# Patient Record
Sex: MALE | Race: WHITE | NOT HISPANIC OR LATINO | Employment: FULL TIME | URBAN - METROPOLITAN AREA
[De-identification: names, ages, dates, MRNs, and addresses within clinical notes are randomized per-mention and may not be internally consistent; named-entity substitution may affect disease eponyms.]

---

## 2023-06-02 ENCOUNTER — OFFICE VISIT (OUTPATIENT)
Dept: PRIMARY CARE | Facility: CLINIC | Age: 28
End: 2023-06-02
Payer: OTHER GOVERNMENT

## 2023-06-02 VITALS
OXYGEN SATURATION: 98 % | HEART RATE: 73 BPM | HEIGHT: 73 IN | SYSTOLIC BLOOD PRESSURE: 104 MMHG | DIASTOLIC BLOOD PRESSURE: 68 MMHG | TEMPERATURE: 97.7 F | BODY MASS INDEX: 22.53 KG/M2 | WEIGHT: 170 LBS

## 2023-06-02 DIAGNOSIS — Z00.00 WELLNESS EXAMINATION: ICD-10-CM

## 2023-06-02 DIAGNOSIS — Z30.09 VASECTOMY EVALUATION: Primary | ICD-10-CM

## 2023-06-02 DIAGNOSIS — E55.9 VITAMIN D DEFICIENCY: ICD-10-CM

## 2023-06-02 PROCEDURE — 99385 PREV VISIT NEW AGE 18-39: CPT | Performed by: STUDENT IN AN ORGANIZED HEALTH CARE EDUCATION/TRAINING PROGRAM

## 2023-06-02 PROCEDURE — 1036F TOBACCO NON-USER: CPT | Performed by: STUDENT IN AN ORGANIZED HEALTH CARE EDUCATION/TRAINING PROGRAM

## 2023-06-02 ASSESSMENT — ENCOUNTER SYMPTOMS
OCCASIONAL FEELINGS OF UNSTEADINESS: 0
DEPRESSION: 0
LOSS OF SENSATION IN FEET: 0

## 2023-06-02 NOTE — PROGRESS NOTES
HPI: 27-year-old male presenting to establish care and for CPE.  Patient doing relatively well without significant concern.  He works in the [employer], recently moved to the area, but from [place].   with 2 children, would like urological consultation for vasectomy.    Does get tenderness in his right elbow, left wrist sometimes.  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  Not currently in pain, episodic in nature.    Denies HA, changes in vision, chest pain, SOB/ORTIZ, palpitations, N/V/D/C, dysuria/hematuria, hematochezia/melena, paresthesias, LE edema.    12 point ROS reviewed and negative other than as stated in HPI    PMH: No medical hx  Medications: none  PSHx: denies  FHx: Dad - HTN  SocHx:   - Smoking: Never   - Alcohol: occasional   - Drug use: None  Allergies: NKDA    General: Alert, oriented, pleasant, in no acute distress  HEENT:      Head: normocephalic, atraumatic;      eyes: EOMI, no scleral icterus;   Neck: soft, supple, non-tender, no masses appreciated  CV: Heart with regular rate and rhythm, normal S1/S2, no murmurs  Lungs: CTAB without wheezing, rhonchi or rales; good respiratory effort, no increased work of breathing  Abdomen: soft, non-tender, non-distended, no masses appreciated  Extremities: no edema, no cyanosis  MSK: ROM of upper and lower extremities intact; strength 5/5 upper and lower extremities  Neuro: Cranial nerves grossly intact; alert and oriented, normal gait  Psych: Appropriate mood and affect    1. HM  -CBC, CMP, Lipid panel, Vit D, TSH with reflex T4  -Vaccines:       Flu: out of season      Shingrix: at 50      Pneumococcal: at 65      Tdap: UTD, in [facility]  -Lung cancer screening with low-dose CT: never smoker  -Colonoscopy: at 45    2.  Vasectomy evaluation  - Urology referral    3.  Lateral epicondylitis  - Handed out exercises, explained first-line treatment including OTC NSAIDs, and preventative  measures such as routine exercise    F/U annually or sooner if indicated    Chris D'Amico, DO

## 2023-06-03 ENCOUNTER — LAB (OUTPATIENT)
Dept: LAB | Facility: LAB | Age: 28
End: 2023-06-03
Payer: OTHER GOVERNMENT

## 2023-06-03 DIAGNOSIS — Z30.09 VASECTOMY EVALUATION: ICD-10-CM

## 2023-06-03 DIAGNOSIS — E55.9 VITAMIN D DEFICIENCY: ICD-10-CM

## 2023-06-03 DIAGNOSIS — Z00.00 WELLNESS EXAMINATION: ICD-10-CM

## 2023-06-03 LAB
ALANINE AMINOTRANSFERASE (SGPT) (U/L) IN SER/PLAS: 22 U/L (ref 10–52)
ALBUMIN (G/DL) IN SER/PLAS: 4.5 G/DL (ref 3.4–5)
ALKALINE PHOSPHATASE (U/L) IN SER/PLAS: 62 U/L (ref 33–120)
ANION GAP IN SER/PLAS: 12 MMOL/L (ref 10–20)
ASPARTATE AMINOTRANSFERASE (SGOT) (U/L) IN SER/PLAS: 21 U/L (ref 9–39)
BILIRUBIN TOTAL (MG/DL) IN SER/PLAS: 0.9 MG/DL (ref 0–1.2)
CALCIDIOL (25 OH VITAMIN D3) (NG/ML) IN SER/PLAS: 27 NG/ML
CALCIUM (MG/DL) IN SER/PLAS: 9.9 MG/DL (ref 8.6–10.6)
CARBON DIOXIDE, TOTAL (MMOL/L) IN SER/PLAS: 26 MMOL/L (ref 21–32)
CHLORIDE (MMOL/L) IN SER/PLAS: 105 MMOL/L (ref 98–107)
CHOLESTEROL (MG/DL) IN SER/PLAS: 133 MG/DL (ref 0–199)
CHOLESTEROL IN HDL (MG/DL) IN SER/PLAS: 48.1 MG/DL
CHOLESTEROL/HDL RATIO: 2.8
CREATININE (MG/DL) IN SER/PLAS: 0.96 MG/DL (ref 0.5–1.3)
ERYTHROCYTE DISTRIBUTION WIDTH (RATIO) BY AUTOMATED COUNT: 12.9 % (ref 11.5–14.5)
ERYTHROCYTE MEAN CORPUSCULAR HEMOGLOBIN CONCENTRATION (G/DL) BY AUTOMATED: 33.2 G/DL (ref 32–36)
ERYTHROCYTE MEAN CORPUSCULAR VOLUME (FL) BY AUTOMATED COUNT: 87 FL (ref 80–100)
ERYTHROCYTES (10*6/UL) IN BLOOD BY AUTOMATED COUNT: 4.92 X10E12/L (ref 4.5–5.9)
GFR MALE: >90 ML/MIN/1.73M2
GLUCOSE (MG/DL) IN SER/PLAS: 93 MG/DL (ref 74–99)
HEMATOCRIT (%) IN BLOOD BY AUTOMATED COUNT: 42.8 % (ref 41–52)
HEMOGLOBIN (G/DL) IN BLOOD: 14.2 G/DL (ref 13.5–17.5)
LDL: 75 MG/DL (ref 0–99)
LEUKOCYTES (10*3/UL) IN BLOOD BY AUTOMATED COUNT: 4.8 X10E9/L (ref 4.4–11.3)
NRBC (PER 100 WBCS) BY AUTOMATED COUNT: 0 /100 WBC (ref 0–0)
PLATELETS (10*3/UL) IN BLOOD AUTOMATED COUNT: 201 X10E9/L (ref 150–450)
POTASSIUM (MMOL/L) IN SER/PLAS: 4.2 MMOL/L (ref 3.5–5.3)
PROTEIN TOTAL: 7.4 G/DL (ref 6.4–8.2)
SODIUM (MMOL/L) IN SER/PLAS: 139 MMOL/L (ref 136–145)
THYROTROPIN (MIU/L) IN SER/PLAS BY DETECTION LIMIT <= 0.05 MIU/L: 2.42 MIU/L (ref 0.44–3.98)
TRIGLYCERIDE (MG/DL) IN SER/PLAS: 48 MG/DL (ref 0–149)
UREA NITROGEN (MG/DL) IN SER/PLAS: 14 MG/DL (ref 6–23)
VLDL: 10 MG/DL (ref 0–40)

## 2023-06-03 PROCEDURE — 84443 ASSAY THYROID STIM HORMONE: CPT

## 2023-06-03 PROCEDURE — 36415 COLL VENOUS BLD VENIPUNCTURE: CPT

## 2023-06-03 PROCEDURE — 82306 VITAMIN D 25 HYDROXY: CPT

## 2023-06-03 PROCEDURE — 85027 COMPLETE CBC AUTOMATED: CPT

## 2023-06-03 PROCEDURE — 80061 LIPID PANEL: CPT

## 2023-06-03 PROCEDURE — 80053 COMPREHEN METABOLIC PANEL: CPT

## 2023-06-05 NOTE — RESULT ENCOUNTER NOTE
Vitamin D borderline low at 27, would recommend OTC vitamin D3, 1000 units daily.    All remaining labs are unremarkable.

## 2024-01-31 DIAGNOSIS — Z98.52 STATUS POST VASECTOMY: Primary | ICD-10-CM

## 2024-01-31 DIAGNOSIS — Z30.09 VASECTOMY EVALUATION: Primary | ICD-10-CM

## 2024-02-01 ENCOUNTER — ANCILLARY PROCEDURE (OUTPATIENT)
Dept: ENDOCRINOLOGY | Facility: CLINIC | Age: 29
End: 2024-02-01
Payer: OTHER GOVERNMENT

## 2024-02-01 DIAGNOSIS — Z30.09 VASECTOMY EVALUATION: ICD-10-CM

## 2024-02-01 LAB
ABSTINENCE (DAYS): 3 DAYS (ref 2–7)
AGGLUTINATION (SEMEN): NO
ANALYZED TIME:: ABNORMAL
ANDROLOGY LAB ID#: ABNORMAL
CLUMPS (SEMEN): NO
COLLECTED COMPLETELY: YES
COLLECTION LOCATION:: ABNORMAL
COLLECTION METHOD:: ABNORMAL
CONCENTRATION CN (POST-WASH): 0 MILL/ML
CONCENTRATION(SEMEN): 0 MILL/ML (ref 15–?)
DEBRIS (SEMEN): YES
NON PROG. MOTILITY (SEMEN): 0 %
NON PROG. MOTILITY CN (POST-WASH): 0 %
PROG. MOTILITY (SEMEN): 0 % (ref 32–?)
PROG. MOTILITY CN (POST-WASH): 0 %
RECEIVED TIME:: ABNORMAL
SEMEN APPEARANCE: NORMAL
SEMEN LIQUEFACTION: NORMAL
SEMEN VISCOSITY: ABNORMAL
TOTAL MOTILITY (SEMEN): 0 % (ref 40–?)
TOTAL MOTILITY CN (POST-WASH): 0 %
TOTAL NO OF MOTILE (SEMEN): 0 MILL
TOTAL NO OF MOTILE CN (POST-WASH): 0 MILL
TOTAL NO OF SPERM (SEMEN): 0 MILL (ref 39–?)
TOTAL NO OF SPERM CN (POST-WASH): 0 MILL
VOLUME (SEMEN): 2 ML (ref 1.5–?)
VOLUME CN (POST-WASH): 0.2 ML

## 2024-02-01 PROCEDURE — 89321 SEMEN ANAL SPERM DETECTION: CPT | Performed by: OBSTETRICS & GYNECOLOGY

## 2024-07-31 ENCOUNTER — APPOINTMENT (OUTPATIENT)
Dept: PRIMARY CARE | Facility: CLINIC | Age: 29
End: 2024-07-31
Payer: OTHER GOVERNMENT

## 2024-08-01 ENCOUNTER — OFFICE VISIT (OUTPATIENT)
Dept: PRIMARY CARE | Facility: CLINIC | Age: 29
End: 2024-08-01
Payer: OTHER GOVERNMENT

## 2024-08-01 VITALS
HEART RATE: 86 BPM | SYSTOLIC BLOOD PRESSURE: 118 MMHG | TEMPERATURE: 98.2 F | HEIGHT: 72 IN | DIASTOLIC BLOOD PRESSURE: 72 MMHG | WEIGHT: 180 LBS | BODY MASS INDEX: 24.38 KG/M2 | OXYGEN SATURATION: 97 %

## 2024-08-01 DIAGNOSIS — E55.9 VITAMIN D DEFICIENCY: ICD-10-CM

## 2024-08-01 DIAGNOSIS — Z23 NEED FOR TETANUS BOOSTER: ICD-10-CM

## 2024-08-01 DIAGNOSIS — Z00.00 ENCOUNTER FOR ANNUAL PHYSICAL EXAM: Primary | ICD-10-CM

## 2024-08-01 DIAGNOSIS — M79.641 PAIN OF RIGHT HAND: ICD-10-CM

## 2024-08-01 PROCEDURE — 3008F BODY MASS INDEX DOCD: CPT | Performed by: INTERNAL MEDICINE

## 2024-08-01 PROCEDURE — 1036F TOBACCO NON-USER: CPT | Performed by: INTERNAL MEDICINE

## 2024-08-01 PROCEDURE — 90715 TDAP VACCINE 7 YRS/> IM: CPT | Performed by: INTERNAL MEDICINE

## 2024-08-01 PROCEDURE — 99395 PREV VISIT EST AGE 18-39: CPT | Performed by: INTERNAL MEDICINE

## 2024-08-01 PROCEDURE — 90471 IMMUNIZATION ADMIN: CPT | Performed by: INTERNAL MEDICINE

## 2024-08-01 ASSESSMENT — ENCOUNTER SYMPTOMS
SORE THROAT: 0
DIZZINESS: 0
PALPITATIONS: 0
FLANK PAIN: 0
APPETITE CHANGE: 0
WEAKNESS: 0
ABDOMINAL DISTENTION: 0
RHINORRHEA: 0
NERVOUS/ANXIOUS: 0
ACTIVITY CHANGE: 0
BRUISES/BLEEDS EASILY: 0
CONFUSION: 0
NAUSEA: 0
STRIDOR: 0
SINUS PRESSURE: 0
CHEST TIGHTNESS: 0
PHOTOPHOBIA: 0
SLEEP DISTURBANCE: 0
MYALGIAS: 1
TROUBLE SWALLOWING: 0
HALLUCINATIONS: 0
COLOR CHANGE: 0
BACK PAIN: 0
FEVER: 0
POLYPHAGIA: 0
CHOKING: 0
DYSURIA: 0
FATIGUE: 0
DYSPHORIC MOOD: 0
CONSTIPATION: 0
VOICE CHANGE: 0
EYE REDNESS: 0
FACIAL ASYMMETRY: 0
SINUS PAIN: 0
WOUND: 0
SHORTNESS OF BREATH: 0
FREQUENCY: 0
VOMITING: 0
DIARRHEA: 0
COUGH: 0
ABDOMINAL PAIN: 0
SEIZURES: 0
BLOOD IN STOOL: 0
EYE DISCHARGE: 0
NUMBNESS: 0
HEMATURIA: 0
SPEECH DIFFICULTY: 0
ARTHRALGIAS: 1
DIFFICULTY URINATING: 0
POLYDIPSIA: 0
WHEEZING: 0
HEADACHES: 0
NECK STIFFNESS: 0

## 2024-08-01 NOTE — PATIENT INSTRUCTIONS
If your right elbow symptoms do not improve by 50% from today in next 3 weeks than call our office.     If right calf muscle symptoms get worse and you are not able to do your routine day to day activities than call us.

## 2024-08-01 NOTE — PROGRESS NOTES
Subjective   Patient ID: Darrius Medrano is a 29 y.o. male who presents for New Patient Visit.    HPI   Patient presented to the office for new patient visit.    His last labs were done last year and his Vitamin D level was 27 but otherwise rest of the labs were good. He is not on oral Vitamin D supplement.    2 weeks ago he got hit at the right elbow joint while playing and since then he has pain in front and back of the elbow joint. Still able to do pronation and supination without difficulty. Pain is getting better but is there.    He also have problem with right hand. He is right handed and works as a [employer] and he has to do several activities which involve the use of right hand especially fingers.    Review of Systems   Constitutional:  Negative for activity change, appetite change, fatigue and fever.   HENT:  Negative for congestion, dental problem, ear pain, hearing loss, rhinorrhea, sinus pressure, sinus pain, sore throat, trouble swallowing and voice change.    Eyes:  Negative for photophobia, discharge, redness and visual disturbance.   Respiratory:  Negative for cough, choking, chest tightness, shortness of breath, wheezing and stridor.    Cardiovascular:  Negative for chest pain, palpitations and leg swelling.   Gastrointestinal:  Negative for abdominal distention, abdominal pain, blood in stool, constipation, diarrhea, nausea and vomiting.   Endocrine: Negative for polydipsia, polyphagia and polyuria.   Genitourinary:  Negative for difficulty urinating, dysuria, flank pain, frequency, hematuria and urgency.   Musculoskeletal:  Positive for arthralgias and myalgias. Negative for back pain and neck stiffness.   Skin:  Negative for color change, rash and wound.   Allergic/Immunologic: Negative for immunocompromised state.   Neurological:  Negative for dizziness, seizures, syncope, facial asymmetry, speech difficulty, weakness, numbness and headaches.   Hematological:  Does not bruise/bleed easily.    Psychiatric/Behavioral:  Negative for behavioral problems, confusion, dysphoric mood, hallucinations, sleep disturbance and suicidal ideas. The patient is not nervous/anxious.      Objective   /72   Pulse 86   Temp 36.8 °C (98.2 °F)   Ht 1.829 m (6')   Wt 81.6 kg (180 lb)   SpO2 97%   BMI 24.41 kg/m²     Physical Exam  Vitals and nursing note reviewed.   Constitutional:       General: He is not in acute distress.     Appearance: Normal appearance. He is normal weight. He is not ill-appearing or toxic-appearing.   HENT:      Head: Normocephalic and atraumatic.      Nose: Nose normal. No congestion or rhinorrhea.      Mouth/Throat:      Mouth: Mucous membranes are moist.   Eyes:      General: No scleral icterus.     Extraocular Movements: Extraocular movements intact.      Conjunctiva/sclera: Conjunctivae normal.      Pupils: Pupils are equal, round, and reactive to light.   Cardiovascular:      Rate and Rhythm: Normal rate and regular rhythm.      Pulses: Normal pulses.      Heart sounds: Normal heart sounds. No murmur heard.     No gallop.   Pulmonary:      Effort: Pulmonary effort is normal. No respiratory distress.      Breath sounds: Normal breath sounds. No stridor. No wheezing, rhonchi or rales.   Abdominal:      General: Bowel sounds are normal.      Tenderness: There is no abdominal tenderness. There is no right CVA tenderness or left CVA tenderness.   Musculoskeletal:         General: No swelling or deformity. Normal range of motion.      Cervical back: Normal range of motion and neck supple. No rigidity or tenderness.      Right lower leg: No edema.      Left lower leg: No edema.   Lymphadenopathy:      Cervical: No cervical adenopathy.   Skin:     General: Skin is warm.      Coloration: Skin is not jaundiced.      Findings: No erythema or lesion.   Neurological:      General: No focal deficit present.      Mental Status: He is alert and oriented to person, place, and time.      Cranial  Nerves: No cranial nerve deficit.      Motor: No weakness.      Coordination: Coordination normal.      Gait: Gait normal.   Psychiatric:         Mood and Affect: Mood normal.         Behavior: Behavior normal.         Thought Content: Thought content normal.         Judgment: Judgment normal.       Assessment/Plan   Problem List Items Addressed This Visit       Vitamin D deficiency    Relevant Orders    Vitamin D 25-Hydroxy,Total (for eval of Vitamin D levels)    Pain of right hand    Relevant Orders    Referral to Orthopaedic Surgery     Other Visit Diagnoses       Encounter for annual physical exam    -  Primary    Relevant Orders    CBC and Auto Differential    Comprehensive Metabolic Panel    Hemoglobin A1C    Lipid Panel    PSA, Total and Free    Hepatitis C Antibody    HIV 1/2 Antigen/Antibody Screen with Reflex to Confirmation    TSH with reflex to Free T4 if abnormal    Need for tetanus booster        Relevant Orders    Tdap vaccine, age 7 years and older  (BOOSTRIX) (Completed)          Right upper extremity exam is negative for any focal finding. He has been discussed to call office in 3 weeks if symptoms are not better.  I just don't see any reason to refer him to any other specialist beside hand orthopedics.

## 2024-08-29 ENCOUNTER — APPOINTMENT (OUTPATIENT)
Dept: ORTHOPEDIC SURGERY | Facility: CLINIC | Age: 29
End: 2024-08-29
Payer: OTHER GOVERNMENT

## 2024-08-29 ENCOUNTER — HOSPITAL ENCOUNTER (OUTPATIENT)
Dept: RADIOLOGY | Facility: CLINIC | Age: 29
Discharge: HOME | End: 2024-08-29
Payer: OTHER GOVERNMENT

## 2024-08-29 DIAGNOSIS — M79.641 PAIN OF RIGHT HAND: ICD-10-CM

## 2024-08-29 PROCEDURE — 73130 X-RAY EXAM OF HAND: CPT | Mod: RT

## 2024-08-29 PROCEDURE — 99203 OFFICE O/P NEW LOW 30 MIN: CPT | Performed by: ORTHOPAEDIC SURGERY

## 2024-08-29 PROCEDURE — 1036F TOBACCO NON-USER: CPT | Performed by: ORTHOPAEDIC SURGERY

## 2024-08-29 NOTE — PROGRESS NOTES
CHIEF COMPLAINT         Right hand pain    ASSESSMENT + PLAN    Right hand intrinsic muscle tightness    I reviewed the unusual nature of the condition and the typical clinical course.  I reviewed how to do intrinsic muscle stretching.  I offered a formal Occupational Therapy referral to help with this as well.  You may safely advance activity with no particular restrictions.    Follow-up with any additional concerns.        HISTORY OF PRESENT ILLNESS       Patient is a 29 y.o. right-hand dominant male [employer] , who presents today for evaluation of right hand pain.  This began on duty about 3 to 6 months ago.  It was a gradual onset.  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  On days where he has been using the hands a lot, he has aching pain into the right ring and long PIP and DIP joints.  No single specific recalled trauma.  No numbness or tingling.  No popping, clicking, or subjective instability.  No similar problems on the left hand.    He is not diabetic or hypothyroid.  He does not smoke.      REVIEW OF SYSTEMS       A 30-item multi-system Review Of Systems was obtained on today's intake form.  This was reviewed with the patient and is correct.  The pertinent positives and negatives are listed above.  The form has been scanned separately into the medical record.      PHYSICAL EXAM    Constitutional:    Appears stated age. Well-developed and well-nourished male in no acute distress.  Psychiatric:         Pleasant normal mood and affect. Behavior is appropriate for the situation.   Head:                   Normocephalic and atraumatic.  Eyes:                    Pupils are equal and round.  Cardiovascular:  2+ radial and ulnar pulses. Fingers well-perfused.  Respiratory:        Effort normal. No respiratory distress. Speaking in complete sentences.  Neurologic:       Alert and oriented to person, place, and time.  Skin:                Skin is intact, warm and dry.  Hematologic /  Lymphatic:    No lymphedema or lymphangitis.    Extremities / Musculoskeletal:                      Skin of the right hand and wrist is intact with no erythema, ecchymosis, or diffuse swelling.  Normal skin drag and coloration.  Full composite finger flexion extension.  Intrinsic minus posture lacks about 2 centimeters in ring and long and reproduces his discomfort.  Good intrinsic plus position actively.  More general discomfort with the transition but not suggesting saddle syndrome.  Tendons are intact by individual testing.  Normal resting cascade.  Good sagittal plane balance.  Symmetric wrist and forearm motion.  Negative Flower.  Negative midcarpal shift.  Negative PT compression and LT shear test.  Stable DRUJ in all stations.  Sensation intact to light touch in all distributions.  Capillary refill less than 2 seconds.      IMAGING / LABS / EMGs           X-rays right hand ordered and independently interpreted by me today show no acute fracture, subluxation, or foreign body.  The joint spaces are concentric and well-preserved.      No past medical history on file.    Medication Documentation Review Audit       Reviewed by Nicko Paez MD (Physician) on 08/01/24 at 1411      Medication Order Taking? Sig Documenting Provider Last Dose Status            No Medications to Display                                   No Known Allergies    Social History     Socioeconomic History    Marital status:      Spouse name: Not on file    Number of children: Not on file    Years of education: Not on file    Highest education level: Not on file   Occupational History    Not on file   Tobacco Use    Smoking status: Never    Smokeless tobacco: Never   Vaping Use    Vaping status: Never Used   Substance and Sexual Activity    Alcohol use: Not on file    Drug use: Not on file    Sexual activity: Not on file   Other Topics Concern    Not on file   Social History Narrative    Not on file     Social Determinants of Health      Financial Resource Strain: Not on file   Food Insecurity: Not on file   Transportation Needs: Not on file   Physical Activity: Not on file   Stress: Not on file   Social Connections: Not on file   Intimate Partner Violence: Not on file   Housing Stability: Not on file       No past surgical history on file.      Electronically Signed      VARGAS Ellis MD      Orthopaedic Hand Surgery      [phone]

## 2024-08-29 NOTE — LETTER
September 7, 2024     Nicko Paez MD  [address]  [address]  [facility] [address]    Patient: Darrius Medrano   YOB: 1995   Date of Visit: 8/29/2024       Dear Dr. Nicko Paez MD:    Thank you for referring Darrius Medrano to me for evaluation. Below are my notes for this consultation.  If you have questions, please do not hesitate to call me. I look forward to following your patient along with you.       Sincerely,     Flavio Ellis MD      CC: No Recipients  ______________________________________________________________________________________    CHIEF COMPLAINT         Right hand pain    ASSESSMENT + PLAN    Right hand intrinsic muscle tightness    I reviewed the unusual nature of the condition and the typical clinical course.  I reviewed how to do intrinsic muscle stretching.  I offered a formal Occupational Therapy referral to help with this as well.  You may safely advance activity with no particular restrictions.    Follow-up with any additional concerns.        HISTORY OF PRESENT ILLNESS       Patient is a 29 y.o. right-hand dominant male [employer] , who presents today for evaluation of right hand pain.  This began on duty about 3 to 6 months ago.  It was a gradual onset.  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  On days where he has been using the hands a lot, he has aching pain into the right ring and long PIP and DIP joints.  No single specific recalled trauma.  No numbness or tingling.  No popping, clicking, or subjective instability.  No similar problems on the left hand.    He is not diabetic or hypothyroid.  He does not smoke.      REVIEW OF SYSTEMS       A 30-item multi-system Review Of Systems was obtained on today's intake form.  This was reviewed with the patient and is correct.  The pertinent positives and negatives are listed above.  The form has been scanned separately into the medical record.      PHYSICAL EXAM    Constitutional:    Appears  stated age. Well-developed and well-nourished male in no acute distress.  Psychiatric:         Pleasant normal mood and affect. Behavior is appropriate for the situation.   Head:                   Normocephalic and atraumatic.  Eyes:                    Pupils are equal and round.  Cardiovascular:  2+ radial and ulnar pulses. Fingers well-perfused.  Respiratory:        Effort normal. No respiratory distress. Speaking in complete sentences.  Neurologic:       Alert and oriented to person, place, and time.  Skin:                Skin is intact, warm and dry.  Hematologic / Lymphatic:    No lymphedema or lymphangitis.    Extremities / Musculoskeletal:                      Skin of the right hand and wrist is intact with no erythema, ecchymosis, or diffuse swelling.  Normal skin drag and coloration.  Full composite finger flexion extension.  Intrinsic minus posture lacks about 2 centimeters in ring and long and reproduces his discomfort.  Good intrinsic plus position actively.  More general discomfort with the transition but not suggesting saddle syndrome.  Tendons are intact by individual testing.  Normal resting cascade.  Good sagittal plane balance.  Symmetric wrist and forearm motion.  Negative Flower.  Negative midcarpal shift.  Negative PT compression and LT shear test.  Stable DRUJ in all stations.  Sensation intact to light touch in all distributions.  Capillary refill less than 2 seconds.      IMAGING / LABS / EMGs           X-rays right hand ordered and independently interpreted by me today show no acute fracture, subluxation, or foreign body.  The joint spaces are concentric and well-preserved.      No past medical history on file.    Medication Documentation Review Audit       Reviewed by Nicko Paez MD (Physician) on 08/01/24 at 1411      Medication Order Taking? Sig Documenting Provider Last Dose Status            No Medications to Display                                   No Known Allergies    Social History      Socioeconomic History   • Marital status:      Spouse name: Not on file   • Number of children: Not on file   • Years of education: Not on file   • Highest education level: Not on file   Occupational History   • Not on file   Tobacco Use   • Smoking status: Never   • Smokeless tobacco: Never   Vaping Use   • Vaping status: Never Used   Substance and Sexual Activity   • Alcohol use: Not on file   • Drug use: Not on file   • Sexual activity: Not on file   Other Topics Concern   • Not on file   Social History Narrative   • Not on file     Social Determinants of Health     Financial Resource Strain: Not on file   Food Insecurity: Not on file   Transportation Needs: Not on file   Physical Activity: Not on file   Stress: Not on file   Social Connections: Not on file   Intimate Partner Violence: Not on file   Housing Stability: Not on file       No past surgical history on file.      Electronically Signed      VARGAS Ellis MD      Orthopaedic Hand Surgery      [phone]

## 2024-09-20 ENCOUNTER — APPOINTMENT (OUTPATIENT)
Dept: OCCUPATIONAL THERAPY | Facility: CLINIC | Age: 29
End: 2024-09-20
Payer: OTHER GOVERNMENT

## 2024-09-26 ENCOUNTER — EVALUATION (OUTPATIENT)
Dept: OCCUPATIONAL THERAPY | Facility: CLINIC | Age: 29
End: 2024-09-26
Payer: OTHER GOVERNMENT

## 2024-09-26 DIAGNOSIS — M79.641 PAIN OF RIGHT HAND: Primary | ICD-10-CM

## 2024-09-26 PROCEDURE — 97110 THERAPEUTIC EXERCISES: CPT | Mod: GO | Performed by: OCCUPATIONAL THERAPIST

## 2024-09-26 PROCEDURE — 97165 OT EVAL LOW COMPLEX 30 MIN: CPT | Mod: GO | Performed by: OCCUPATIONAL THERAPIST

## 2024-09-26 ASSESSMENT — PAIN DESCRIPTION - DESCRIPTORS: DESCRIPTORS: TENDER

## 2024-09-26 ASSESSMENT — PAIN SCALES - GENERAL: PAINLEVEL_OUTOF10: 5 - MODERATE PAIN

## 2024-09-26 ASSESSMENT — PAIN - FUNCTIONAL ASSESSMENT: PAIN_FUNCTIONAL_ASSESSMENT: 0-10

## 2024-09-26 NOTE — PROGRESS NOTES
Occupational Therapy   Upper Extremity Evaluation Note    Patient Name: Darrius Medrano  MRN: [id number]  Referring Physician: Dr. Ellis  Allergies:   none Date of Injury:  onset 3-6 months  Mechanism of Injury: NA   Date of Surgery: NA  Precautions: none          Current Problem  1. Pain of right hand  Referral to Occupational Therapy    Follow Up In Occupational Therapy       Insurance    Visit number: 1  Approved number of visits:    Authorization required prior to treatment: Yes  Insurance Company:  [employer] - PRIOR AUTH REQUIRED (sent to [employer] w/s) - 100% COVERAGE / $0 OUT OF POCKET / Per [employer] w/s / ds 9/19/24.   Co-Pay $0.00           MRN: [id number]  OT Received on: 9/26/2024    Time Calculation  Start Time: 1146  Stop Time: 1240  Time Calculation (min): 54 min  OT Evaluation Time Entry  OT Evaluation (Low) Time Entry: 30  OT Therapeutic Procedures Time Entry  Therapeutic Exercise Time Entry: 24    Visit # 1 of 3  Visits/Dates Authorized: approved       Subjective  Patient's Chief Concern: right hand pain right long finger  Patient would like to functionally improve: reduce pain when gripping    Pain  Pain Assessment  Pain Assessment: 0-10  0-10 (Numeric) Pain Score: 5 - Moderate pain  Pain Location: Finger (Comment which one)  Pain Orientation: Right  Pain Radiating Towards: right long finger PIP joint  Pain Descriptors: Tender  Pain Frequency: Intermittent  Clinical Progression: Not changed  Effect of Pain on Daily Activities: when gripping, has occurred for the last few years      Objective  Hand Dominance: RIGHT     Affected Extremity:   RIght    Outcome Measures:  UEFI score 72/80  Other Outcome Measures: UEFI score 72/80    ADLS/IADLS:  indep with alls ADL and IADL;    Skin/ Wound/Scar:  WNL    Sensation: WFL intact to light touch    Dexterity: WNL    Special Tests:  completed per Dr. Ellis:  Full composite finger flexion extension. Intrinsic minus posture lacks about 2  centimeters in ring and long and reproduces his discomfort. Good intrinsic plus position actively. More general discomfort with the transition but not suggesting saddle syndrome. Tendons are intact by individual testing. Normal resting cascade. Good sagittal plane balance. Symmetric wrist and forearm motion. Negative Flower. Negative midcarpal shift. Negative PT compression and LT shear test. Stable DRUJ in all stations. Sensation intact to light touch in all distributions.       Hand Strength  Sudhir Dynamometer    Gross Grasp (lbs)  Right Left   2nd setting 135 lbs. Flexed elbow and 127 lbs. ext elbow 135 lbs. Flexed elbow and 130 lbs. extended       Pinch Strength Right Left   Lateral 32 lbs. 24 lbs.   Tripod 28 lbs.  24 lbs.    Tip          TREATMENT:  Completed and provided AROM of all fingers including tendon glides and intrinsic stretching issued handout for HEP 3 x daily for 20 reps;   Provided T-putty resistance medium to firm with ext/add/abd and gripping and pinching with correct position as verbal and manual cues provided.  Edema Management including icing with firm palmar pressure Palmar massage with tools provided for home program  Soft tissue mobilization with volar/dorsal and intrinsic mm      HEP and Patient Education:      Educated patient to diagnosis and rehab expectations including frequency and duration of services.     ASSESSMENT:  Evaluation Data: Patient is a 30 yo male  s/p right hand pain unknown onset, 3-6 months resulting in limited participation in pain-free gripping with work tasks and ADLs and inability to perform at their prior level of function. Skilled Occupational Therapy services are warranted to address the impairment of full  ability without pain. Goals address to realize measurable change in the outcome measures and achieve improvements in patient’s functional status and individual goals.     Home living/Social Support: Resides home, independently, with spouse,  2  children, enjoys sports, baseball, basketball, bowling, completes lawn care. .  Occupation:  works as construction at [employer]  Medical Leave/Modified Duty: no  Meaningful Activities:  sports  Prior Level of Function Per Patient/Caregiver Report: Independent with ADL, IADL, work and leisure activities  Patient would like to gain recovery of their RIGHT hand function to improve their level of independence with ADLs, IADLs    PLAN OF CARE:    Plan    Plan to follow every 2 weeks for 6 weeks; recheck strength and HEP upgrade as needed.  Follow Up with Referring Physician: to be determined  Monitor home program.    Patient instructed to call or email with questions or concerns.    Frequency: 1x/week  Duration: 6 weeks  Comprehensive reassessments will be completed intermittently.   Potential to achieve rehab goals is good . Patient would benefit from skilled Occupational Therapy services to address deficits and promote functional gains and return to timely completion of self care demands and IADL's.         Planned Interventions include: wound care as needed, patient education/instruction, home program instruction and review, edema control, manual therapy, motor coordination, therapeutic exercise, therapeutic activities, ADL and IADL retraining, neuromuscular re-education, dry needling, NMES, Fluidotherapy, ultrasound, Kinesiotaping, orthosis fabrication/fit training.    CHART REVIEW: YES    Goals:  Active       OT Problem       Patient will report 0/10 no pain HEP with abd/add intrinsic strengthening program.       Start:  09/26/24    Expected End:  12/25/24            Pt will demonstrate good understanding of cumulative trauma and use of stretching and warm up, icing for palmar gripping tasks. (Progressing)       Start:  09/26/24    Expected End:  12/25/24            PATIENT WILL SHOW A SIGNIFICANT CHANGE IN UEFI PATIENT REPORTED OUTCOME TOOL TO DEMONSTRATE SUBJECTIVE IMPROVEMENT       Start:  09/26/24    Expected  End:  12/25/24            PATIENT WILL DEMONSTRATE INDEPENDENCE IN HOME PROGRAM FOR SUPPORT OF PROGRESSION (Progressing)       Start:  09/26/24    Expected End:  12/25/24            PATIENT WILL REPORT PAIN OF 0/10 DEMONSTRATING A REDUCTION OF OVERALL PAIN       Start:  09/26/24    Expected End:  12/25/24

## 2024-10-01 ENCOUNTER — APPOINTMENT (OUTPATIENT)
Dept: PRIMARY CARE | Facility: CLINIC | Age: 29
End: 2024-10-01
Payer: OTHER GOVERNMENT

## 2024-10-01 ENCOUNTER — LAB (OUTPATIENT)
Dept: LAB | Facility: LAB | Age: 29
End: 2024-10-01
Payer: OTHER GOVERNMENT

## 2024-10-01 ENCOUNTER — OFFICE VISIT (OUTPATIENT)
Dept: PRIMARY CARE | Facility: CLINIC | Age: 29
End: 2024-10-01
Payer: OTHER GOVERNMENT

## 2024-10-01 VITALS
DIASTOLIC BLOOD PRESSURE: 84 MMHG | OXYGEN SATURATION: 98 % | BODY MASS INDEX: 24.22 KG/M2 | SYSTOLIC BLOOD PRESSURE: 118 MMHG | HEART RATE: 72 BPM | WEIGHT: 178.6 LBS | TEMPERATURE: 98 F

## 2024-10-01 DIAGNOSIS — Z00.00 ENCOUNTER FOR ANNUAL PHYSICAL EXAM: ICD-10-CM

## 2024-10-01 DIAGNOSIS — M79.641 PAIN OF RIGHT HAND: Primary | ICD-10-CM

## 2024-10-01 DIAGNOSIS — E55.9 VITAMIN D DEFICIENCY: ICD-10-CM

## 2024-10-01 LAB — TSH SERPL-ACNC: 0.79 MIU/L (ref 0.44–3.98)

## 2024-10-01 PROCEDURE — 84443 ASSAY THYROID STIM HORMONE: CPT

## 2024-10-01 PROCEDURE — 36415 COLL VENOUS BLD VENIPUNCTURE: CPT

## 2024-10-01 PROCEDURE — 1036F TOBACCO NON-USER: CPT | Performed by: INTERNAL MEDICINE

## 2024-10-01 PROCEDURE — 99213 OFFICE O/P EST LOW 20 MIN: CPT | Performed by: INTERNAL MEDICINE

## 2024-10-01 ASSESSMENT — PATIENT HEALTH QUESTIONNAIRE - PHQ9
1. LITTLE INTEREST OR PLEASURE IN DOING THINGS: NOT AT ALL
SUM OF ALL RESPONSES TO PHQ9 QUESTIONS 1 AND 2: 0
2. FEELING DOWN, DEPRESSED OR HOPELESS: NOT AT ALL

## 2024-10-01 ASSESSMENT — ENCOUNTER SYMPTOMS
FACIAL ASYMMETRY: 0
DYSURIA: 0
ACTIVITY CHANGE: 0
FLANK PAIN: 0
DIFFICULTY URINATING: 0
BLOOD IN STOOL: 0
CHOKING: 0
PALPITATIONS: 0
SPEECH DIFFICULTY: 0
ABDOMINAL PAIN: 0
HEADACHES: 0
HALLUCINATIONS: 0
FEVER: 0
NAUSEA: 0
NUMBNESS: 0
POLYDIPSIA: 0
FREQUENCY: 0
PHOTOPHOBIA: 0
DYSPHORIC MOOD: 0
CONSTIPATION: 0
NERVOUS/ANXIOUS: 0
FATIGUE: 0
COLOR CHANGE: 0
APPETITE CHANGE: 0
SINUS PAIN: 0
EYE DISCHARGE: 0
ABDOMINAL DISTENTION: 0
CONFUSION: 0
DIARRHEA: 0
CHEST TIGHTNESS: 0
NECK STIFFNESS: 0
SEIZURES: 0
WHEEZING: 0
WOUND: 0
EYE REDNESS: 0
SHORTNESS OF BREATH: 0
HEMATURIA: 0
STRIDOR: 0
DIZZINESS: 0
MYALGIAS: 0
BRUISES/BLEEDS EASILY: 0
POLYPHAGIA: 0
ARTHRALGIAS: 1
RHINORRHEA: 0
VOMITING: 0
BACK PAIN: 0
VOICE CHANGE: 0
SINUS PRESSURE: 0
WEAKNESS: 0
TROUBLE SWALLOWING: 0
COUGH: 0

## 2024-10-01 ASSESSMENT — PAIN SCALES - GENERAL: PAINLEVEL: 0-NO PAIN

## 2024-10-01 NOTE — PROGRESS NOTES
Subjective   Patient ID: Darrius Medrano is a 29 y.o. male who presents for Follow-up.    HPI   Patient presented for 2 month follow up.  He saw hand orthopedics for right hand pain and advised to do occupation therapy of right hand. He just started doing it and its little better but still there.    He did not get labs done, it was ordered at last visit.    Review of Systems   Constitutional:  Negative for activity change, appetite change, fatigue and fever.   HENT:  Negative for congestion, dental problem, ear pain, hearing loss, rhinorrhea, sinus pressure, sinus pain, trouble swallowing and voice change.    Eyes:  Negative for photophobia, discharge, redness and visual disturbance.   Respiratory:  Negative for cough, choking, chest tightness, shortness of breath, wheezing and stridor.    Cardiovascular:  Negative for chest pain, palpitations and leg swelling.   Gastrointestinal:  Negative for abdominal distention, abdominal pain, blood in stool, constipation, diarrhea, nausea and vomiting.   Endocrine: Negative for polydipsia, polyphagia and polyuria.   Genitourinary:  Negative for difficulty urinating, dysuria, flank pain, frequency, hematuria and urgency.   Musculoskeletal:  Positive for arthralgias. Negative for back pain, myalgias and neck stiffness.   Skin:  Negative for color change, rash and wound.   Allergic/Immunologic: Negative for immunocompromised state.   Neurological:  Negative for dizziness, seizures, syncope, facial asymmetry, speech difficulty, weakness, numbness and headaches.   Hematological:  Does not bruise/bleed easily.   Psychiatric/Behavioral:  Negative for behavioral problems, confusion, dysphoric mood, hallucinations and suicidal ideas. The patient is not nervous/anxious.      Objective   /84   Pulse 72   Temp 36.7 °C (98 °F)   Wt 81 kg (178 lb 9.6 oz)   SpO2 98%   BMI 24.22 kg/m²     Physical Exam  Vitals and nursing note reviewed.   Constitutional:       General: He is not in  acute distress.     Appearance: Normal appearance. He is not ill-appearing or toxic-appearing.   HENT:      Nose: Nose normal.   Eyes:      Conjunctiva/sclera: Conjunctivae normal.   Cardiovascular:      Rate and Rhythm: Normal rate and regular rhythm.      Pulses: Normal pulses.      Heart sounds: Normal heart sounds. No murmur heard.  Pulmonary:      Effort: Pulmonary effort is normal. No respiratory distress.      Breath sounds: Normal breath sounds. No stridor. No wheezing, rhonchi or rales.   Skin:     General: Skin is warm.   Neurological:      General: No focal deficit present.      Mental Status: He is alert and oriented to person, place, and time.   Psychiatric:         Mood and Affect: Mood normal.         Behavior: Behavior normal.       Assessment/Plan   Problem List Items Addressed This Visit       Vitamin D deficiency     Get your labs done today.         Pain of right hand - Primary     Continue with PT/OT.

## 2024-10-10 ENCOUNTER — APPOINTMENT (OUTPATIENT)
Dept: OCCUPATIONAL THERAPY | Facility: CLINIC | Age: 29
End: 2024-10-10
Payer: OTHER GOVERNMENT

## 2024-10-14 ENCOUNTER — TREATMENT (OUTPATIENT)
Dept: OCCUPATIONAL THERAPY | Facility: CLINIC | Age: 29
End: 2024-10-14
Payer: OTHER GOVERNMENT

## 2024-10-14 DIAGNOSIS — M79.641 PAIN OF RIGHT HAND: ICD-10-CM

## 2024-10-14 PROCEDURE — 97530 THERAPEUTIC ACTIVITIES: CPT | Mod: GO | Performed by: OCCUPATIONAL THERAPIST

## 2024-10-14 PROCEDURE — 97140 MANUAL THERAPY 1/> REGIONS: CPT | Mod: GO | Performed by: OCCUPATIONAL THERAPIST

## 2024-10-14 PROCEDURE — 97110 THERAPEUTIC EXERCISES: CPT | Mod: GO | Performed by: OCCUPATIONAL THERAPIST

## 2024-10-14 ASSESSMENT — PAIN - FUNCTIONAL ASSESSMENT: PAIN_FUNCTIONAL_ASSESSMENT: 0-10

## 2024-10-14 ASSESSMENT — PAIN DESCRIPTION - DESCRIPTORS: DESCRIPTORS: TENDER

## 2024-10-14 ASSESSMENT — PAIN SCALES - GENERAL: PAINLEVEL_OUTOF10: 4

## 2024-10-14 NOTE — PROGRESS NOTES
Occupational Therapy   Upper Extremity Evaluation Note    Patient Name: Darrius Medrano  MRN: [id number]  Referring Physician: Dr. Ellis  Allergies:   none Date of Injury:  onset 3-6 months  Mechanism of Injury: NA   Date of Surgery: NA  Precautions: none          Current Problem  1. Pain of right hand  Follow Up In Occupational Therapy       Insurance    Visit number: 2  Approved number of visits:    Authorization required prior to treatment: Yes  Insurance Company:  [employer] - PRIOR AUTH REQUIRED (sent to [employer] w/s) - 100% COVERAGE / $0 OUT OF POCKET / Per [employer] w/s / ds 9/19/24.   Co-Pay $0.00           MRN: [id number]  OT Received on: 9/26/2024    Time:  Time Calculation  Start Time: 1115  Stop Time: 1204  Time Calculation (min): 49 min  OT Therapeutic Procedures Time Entry  Manual Therapy Time Entry: 10  Therapeutic Activity Time Entry: 15  Therapeutic Exercise Time Entry: 24    Insurance:  Visit number: 2 of 3  Authorization info: [place]  Ins[place] Type: Payor: [employer] / Plan: [employer] / Product Type: *No Product type* /      Visit # 1 of 3  Visits/Dates Authorized: approved       Subjective  Patient's Chief Concern: right hand pain right long finger  Patient would like to functionally improve: reduce pain when gripping    Pain  Pain Assessment  Pain Assessment: 0-10  0-10 (Numeric) Pain Score: 4  Pain Location: Finger (Comment which one)  Pain Radiating Towards: right long finger DIP joint  Pain Descriptors: Tender  Pain Frequency: Intermittent      Objective  Hand Dominance: RIGHT     Affected Extremity:   RIght    Outcome Measures:  UEFI score 72/80       ADLS/IADLS:  indep with alls ADL and IADL;    Skin/ Wound/Scar:  WNL    Sensation: WFL intact to light touch    Dexterity: WNL    Special Tests:  completed per Dr. Ellis:  Full composite finger flexion extension. Intrinsic minus posture lacks about 2 centimeters in ring and long and reproduces his discomfort. Good intrinsic  plus position actively. More general discomfort with the transition but not suggesting saddle syndrome. Tendons are intact by individual testing. Normal resting cascade. Good sagittal plane balance. Symmetric wrist and forearm motion. Negative Flower. Negative midcarpal shift. Negative PT compression and LT shear test. Stable DRUJ in all stations. Sensation intact to light touch in all distributions.       Hand Strength  Sudhir Dynamometer    Gross Grasp (lbs)  Right Left   2nd setting 135 lbs. Flexed elbow and 127 lbs. ext elbow 135 lbs. Flexed elbow and 130 lbs. extended       Pinch Strength Right Left   Lateral 32 lbs. 24 lbs.   Tripod 28 lbs.  24 lbs.    Tip          TREATMENT:  Therapeutic exercises Completed and provided AROM of all fingers including tendon glides and intrinsic stretching issued handout for HEP 3 x daily for 20 reps; Completed resistance abd/add of fingers with gel glove, tolerated well  Therapeutic activities  Completed T-putty resistance medium to firm with ext/add/abd and gripping and pinching with correct position as verbal and manual cues provided.  Edema Management including icing with firm palmar pressure Palmar massage with tools provided for home program  Reviewed home program of STM with volar/dorsal and intrinsic mm  Manual therapy with KT taping and joint mobilization R long finger PIP and DIP joint;    HEP and Patient Education:      Educated patient to diagnosis and rehab expectations including frequency and duration of services.     ASSESSMENT:  Noting improvement of pain-free gripping with work tasks and ADLs and inability to perform at their prior level of function.   Continue with skilled Occupational Therapy services are warranted to address the impairment of full  ability without pain.   Goals address to realize measurable change in the outcome measures and achieve improvements in patient’s functional status and individual goals.     Home living/Social Support: Resides  home, independently, with spouse,  2 children, enjoys sports, baseball, basketball, bowling, completes lawn care. .  Occupation:  works as construction at [employer]  Medical Leave/Modified Duty: no  Meaningful Activities:  sports  Prior Level of Function Per Patient/Caregiver Report: Independent with ADL, IADL, work and leisure activities  Patient would like to gain recovery of their RIGHT hand function to improve their level of independence with ADLs, IADLs    PLAN OF CARE:    Plan    Plan to follow every 2 weeks for 6 weeks; recheck strength and HEP upgrade as needed.  Follow Up with Referring Physician: to be determined  Monitor home program.    Patient instructed to call or email with questions or concerns.    Frequency: 1x/week  Duration: 6 weeks  Comprehensive reassessments will be completed intermittently.   Potential to achieve rehab goals is good . Patient would benefit from skilled Occupational Therapy services to address deficits and promote functional gains and return to timely completion of self care demands and IADL's.         Planned Interventions include: wound care as needed, patient education/instruction, home program instruction and review, edema control, manual therapy, motor coordination, therapeutic exercise, therapeutic activities, ADL and IADL retraining, neuromuscular re-education, dry needling, NMES, Fluidotherapy, ultrasound, Kinesiotaping, orthosis fabrication/fit training.    CHART REVIEW: YES    Goals:  Active       OT Problem       Patient will report 0/10 no pain HEP with abd/add intrinsic strengthening program. (Progressing)       Start:  09/26/24    Expected End:  12/25/24            Pt will demonstrate good understanding of cumulative trauma and use of stretching and warm up, icing for palmar gripping tasks. (Progressing)       Start:  09/26/24    Expected End:  12/25/24            PATIENT WILL SHOW A SIGNIFICANT CHANGE IN UEFI PATIENT REPORTED OUTCOME TOOL TO DEMONSTRATE  SUBJECTIVE IMPROVEMENT (Progressing)       Start:  09/26/24    Expected End:  12/25/24            PATIENT WILL DEMONSTRATE INDEPENDENCE IN HOME PROGRAM FOR SUPPORT OF PROGRESSION (Progressing)       Start:  09/26/24    Expected End:  12/25/24            PATIENT WILL REPORT PAIN OF 0/10 DEMONSTRATING A REDUCTION OF OVERALL PAIN (Progressing)       Start:  09/26/24    Expected End:  12/25/24

## 2024-11-25 ENCOUNTER — DOCUMENTATION (OUTPATIENT)
Dept: OCCUPATIONAL THERAPY | Facility: CLINIC | Age: 29
End: 2024-11-25
Payer: OTHER GOVERNMENT

## 2024-11-25 NOTE — PROGRESS NOTES
Occupational Therapy    Discharge Summary    Name: Darrius Medrano  MRN: [id number]  : 1995  Date: 24    Discharge Summary: OT    Discharge Information: Date of discharge 24    Therapy Summary: Pt status per last note    Discharge Status: discharged     Rehab Discharge Reason: Failed to schedule and/or keep follow-up appointment(s)

## 2025-01-21 ENCOUNTER — OFFICE VISIT (OUTPATIENT)
Dept: URGENT CARE | Age: 30
End: 2025-01-21
Payer: OTHER GOVERNMENT

## 2025-01-21 VITALS
OXYGEN SATURATION: 99 % | DIASTOLIC BLOOD PRESSURE: 81 MMHG | HEART RATE: 76 BPM | RESPIRATION RATE: 20 BRPM | SYSTOLIC BLOOD PRESSURE: 121 MMHG | TEMPERATURE: 98.2 F

## 2025-01-21 DIAGNOSIS — J02.0 STREP PHARYNGITIS: Primary | ICD-10-CM

## 2025-01-21 DIAGNOSIS — J02.9 SORE THROAT: ICD-10-CM

## 2025-01-21 LAB
POC RAPID INFLUENZA A: NEGATIVE
POC RAPID INFLUENZA B: NEGATIVE
POC RAPID STREP: POSITIVE
POC SARS-COV-2 AG BINAX: NORMAL

## 2025-01-21 PROCEDURE — 99214 OFFICE O/P EST MOD 30 MIN: CPT | Performed by: NURSE PRACTITIONER

## 2025-01-21 PROCEDURE — 87811 SARS-COV-2 COVID19 W/OPTIC: CPT | Performed by: NURSE PRACTITIONER

## 2025-01-21 PROCEDURE — 87804 INFLUENZA ASSAY W/OPTIC: CPT | Performed by: NURSE PRACTITIONER

## 2025-01-21 PROCEDURE — 87880 STREP A ASSAY W/OPTIC: CPT | Performed by: NURSE PRACTITIONER

## 2025-01-21 RX ORDER — AMOXICILLIN 500 MG/1
500 CAPSULE ORAL EVERY 12 HOURS SCHEDULED
Qty: 20 CAPSULE | Refills: 0 | Status: SHIPPED | OUTPATIENT
Start: 2025-01-21 | End: 2025-01-31

## 2025-01-21 ASSESSMENT — ENCOUNTER SYMPTOMS: SORE THROAT: 1

## 2025-01-21 NOTE — PROGRESS NOTES
"Subjective   Patient ID: Darrius Medrano is a 29 y.o. male. They present today with a chief complaint of Sore Throat (Sunday x sore throat, headache,body aches, gets \"winded\" easily ).    History of Present Illness  Darrius Medrano is a 29 y.o. male who presents to the clinic for 2 to 3 days of sore throat, body ache, feeling winded while doing activities. Pt denies any chest pain, sob, N/V at this time in clinic.             Past Medical History  Allergies as of 01/21/2025    (No Known Allergies)       (Not in a hospital admission)       No past medical history on file.    No past surgical history on file.     reports that he has never smoked. He has never used smokeless tobacco.    Review of Systems  Review of Systems   HENT:  Positive for sore throat.    All other systems reviewed and are negative.                                 Objective    Vitals:    01/21/25 1420   BP: 121/81   Pulse: 76   Resp: 20   Temp: 36.8 °C (98.2 °F)   SpO2: 99%     No LMP for male patient.    Physical Exam  Constitutional:       Appearance: Normal appearance.   HENT:      Right Ear: Tympanic membrane normal.      Left Ear: Tympanic membrane normal.      Mouth/Throat:      Pharynx: Posterior oropharyngeal erythema present.      Tonsils: No tonsillar exudate. 2+ on the right. 2+ on the left.   Cardiovascular:      Rate and Rhythm: Normal rate and regular rhythm.   Pulmonary:      Effort: Pulmonary effort is normal.      Breath sounds: Normal breath sounds.   Neurological:      General: No focal deficit present.      Mental Status: He is alert and oriented to person, place, and time. Mental status is at baseline.   Psychiatric:         Mood and Affect: Mood normal.         Behavior: Behavior normal.         Procedures    Point of Care Test & Imaging Results from this visit  Results for orders placed or performed in visit on 01/21/25   POCT Covid-19 Rapid Antigen   Result Value Ref Range    POC BENI-COV-2 AG  Presumptive negative test for " SARS-CoV-2 (no antigen detected)     Presumptive negative test for SARS-CoV-2 (no antigen detected)   POCT Influenza A/B manually resulted   Result Value Ref Range    POC Rapid Influenza A Negative Negative    POC Rapid Influenza B Negative Negative   POCT rapid strep A manually resulted   Result Value Ref Range    POC Rapid Strep Positive (A) Negative      No results found.    Diagnostic study results (if any) were reviewed by PRESLEY Barahona.    Assessment/Plan   Allergies, medications, history, and pertinent labs/EKGs/Imaging reviewed by PRESLEY Barahona.     Medical Decision Making  Signs, symptoms, examination, and rapid testing consistent with strep pharyngitis. Patient is without evidence of PTA, deep neck infection, Ludwigs angina, or Sepsis. Patient will be treated with antibiotics and symptomatic support Patient is encouraged to return if symptoms worsen or do not improve. Otherwise follow with PCP.      Orders and Diagnoses  Diagnoses and all orders for this visit:  Strep pharyngitis  -     amoxicillin (Amoxil) 500 mg capsule; Take 1 capsule (500 mg) by mouth every 12 hours for 10 days.  Sore throat  -     POCT Covid-19 Rapid Antigen  -     POCT Influenza A/B manually resulted  -     POCT rapid strep A manually resulted      Medical Admin Record      Patient disposition: Home    Electronically signed by PRESLEY Barahona  2:46 PM

## 2025-01-21 NOTE — PATIENT INSTRUCTIONS
Amoxicillin 1 tablet 2 times a day for 10 days.  Okay to resume activities after 24 hours antibiotic therapy.  Please change her toothbrush after 24 to 36 hours antibiotic therapy.  Ibuprofen for pain control.  Please follow-up with your PCP in 5 to 7 days.  If worsening symptoms, please go to local emergency department for further evaluation.    Please follow up with your primary provider within one week if symptoms do not improve.  You may schedule an appointment online at [email or web address] or call [phone]. Go to the Emergency Department if symptoms significantly worsen or if you develop chest pain or shortness of breath.

## 2025-04-01 ENCOUNTER — APPOINTMENT (OUTPATIENT)
Dept: PRIMARY CARE | Facility: CLINIC | Age: 30
End: 2025-04-01
Payer: OTHER GOVERNMENT

## 2025-04-01 VITALS
TEMPERATURE: 97.3 F | DIASTOLIC BLOOD PRESSURE: 82 MMHG | HEIGHT: 72 IN | WEIGHT: 178 LBS | BODY MASS INDEX: 24.11 KG/M2 | OXYGEN SATURATION: 99 % | HEART RATE: 81 BPM | SYSTOLIC BLOOD PRESSURE: 118 MMHG

## 2025-04-01 DIAGNOSIS — Z00.00 ENCOUNTER FOR ANNUAL PHYSICAL EXAM: Primary | ICD-10-CM

## 2025-04-01 DIAGNOSIS — E55.9 VITAMIN D DEFICIENCY: ICD-10-CM

## 2025-04-01 PROCEDURE — 3008F BODY MASS INDEX DOCD: CPT | Performed by: INTERNAL MEDICINE

## 2025-04-01 PROCEDURE — 99213 OFFICE O/P EST LOW 20 MIN: CPT | Performed by: INTERNAL MEDICINE

## 2025-04-01 PROCEDURE — 1036F TOBACCO NON-USER: CPT | Performed by: INTERNAL MEDICINE

## 2025-04-01 ASSESSMENT — ENCOUNTER SYMPTOMS
WEAKNESS: 0
WOUND: 0
PHOTOPHOBIA: 0
SHORTNESS OF BREATH: 0
NECK STIFFNESS: 0
DIFFICULTY URINATING: 0
ABDOMINAL PAIN: 0
STRIDOR: 0
HEMATURIA: 0
CONSTIPATION: 0
BACK PAIN: 0
WHEEZING: 0
EYE DISCHARGE: 0
EYE REDNESS: 0
HEADACHES: 0
FREQUENCY: 0
DIZZINESS: 0
COLOR CHANGE: 0
VOICE CHANGE: 0
FEVER: 0
DYSURIA: 0
SPEECH DIFFICULTY: 0
RHINORRHEA: 0
FLANK PAIN: 0
BLOOD IN STOOL: 0
TROUBLE SWALLOWING: 0
NUMBNESS: 0
BRUISES/BLEEDS EASILY: 0
COUGH: 0
SINUS PAIN: 0
SINUS PRESSURE: 0
APPETITE CHANGE: 0
SEIZURES: 0
ABDOMINAL DISTENTION: 0
CHEST TIGHTNESS: 0
MYALGIAS: 0
DYSPHORIC MOOD: 0
SLEEP DISTURBANCE: 1
NAUSEA: 0
POLYPHAGIA: 0
FATIGUE: 1
ARTHRALGIAS: 0
ACTIVITY CHANGE: 0
VOMITING: 0
NERVOUS/ANXIOUS: 0
DIARRHEA: 0
CHOKING: 0
POLYDIPSIA: 0
HALLUCINATIONS: 0
CONFUSION: 0
PALPITATIONS: 0
FACIAL ASYMMETRY: 0

## 2025-04-01 NOTE — PROGRESS NOTES
Subjective   Patient ID: Darrius Medrano is a 29 y.o. male who presents for Follow-up.    HPI   Patient presented to the office for symptoms of feeling fatigue and tired after dinner between 530 to 8 PM. After dinner its really hard to stay awake. His work is physically demanding. He also has two kids between the age of 3- 6 years.   He sleep at the night time and roll in the bed few times each night. Denied for snoring by wife except when he is really tired.  Patient tried melatonin in the past for sleeping.    He also has vitamin D deficiency. Patient also need routine labs which has been ordered today.    Review of Systems   Constitutional:  Positive for fatigue. Negative for activity change, appetite change and fever.   HENT:  Negative for congestion, dental problem, ear pain, hearing loss, rhinorrhea, sinus pressure, sinus pain, trouble swallowing and voice change.    Eyes:  Negative for photophobia, discharge, redness and visual disturbance.   Respiratory:  Negative for cough, choking, chest tightness, shortness of breath, wheezing and stridor.    Cardiovascular:  Negative for chest pain, palpitations and leg swelling.   Gastrointestinal:  Negative for abdominal distention, abdominal pain, blood in stool, constipation, diarrhea, nausea and vomiting.   Endocrine: Negative for polydipsia, polyphagia and polyuria.   Genitourinary:  Negative for difficulty urinating, dysuria, flank pain, frequency, hematuria and urgency.   Musculoskeletal:  Negative for arthralgias, back pain, myalgias and neck stiffness.   Skin:  Negative for color change, rash and wound.   Allergic/Immunologic: Negative for immunocompromised state.   Neurological:  Negative for dizziness, seizures, syncope, facial asymmetry, speech difficulty, weakness, numbness and headaches.   Hematological:  Does not bruise/bleed easily.   Psychiatric/Behavioral:  Positive for sleep disturbance. Negative for behavioral problems, confusion, dysphoric mood,  hallucinations and suicidal ideas. The patient is not nervous/anxious.        Objective   /82   Pulse 81   Temp 36.3 °C (97.3 °F)   Ht 1.829 m (6')   Wt 80.7 kg (178 lb)   SpO2 99%   BMI 24.14 kg/m²     Physical Exam  Vitals and nursing note reviewed.   Constitutional:       General: He is not in acute distress.     Appearance: Normal appearance. He is normal weight. He is not ill-appearing or toxic-appearing.   HENT:      Head: Normocephalic.      Nose: Nose normal.      Mouth/Throat:      Mouth: Mucous membranes are moist.   Eyes:      General: No scleral icterus.     Conjunctiva/sclera: Conjunctivae normal.   Cardiovascular:      Rate and Rhythm: Normal rate and regular rhythm.      Pulses: Normal pulses.      Heart sounds: Normal heart sounds. No murmur heard.  Pulmonary:      Effort: Pulmonary effort is normal. No respiratory distress.      Breath sounds: Normal breath sounds. No stridor. No wheezing, rhonchi or rales.   Abdominal:      General: Abdomen is flat. Bowel sounds are normal.      Tenderness: There is no abdominal tenderness.   Musculoskeletal:         General: No swelling or deformity. Normal range of motion.      Cervical back: Normal range of motion.      Right lower leg: No edema.      Left lower leg: No edema.   Skin:     General: Skin is warm.      Coloration: Skin is not jaundiced.      Findings: No erythema or lesion.   Neurological:      General: No focal deficit present.      Mental Status: He is alert and oriented to person, place, and time.      Cranial Nerves: No cranial nerve deficit.      Motor: No weakness.      Coordination: Coordination normal.      Gait: Gait normal.   Psychiatric:         Mood and Affect: Mood normal.         Behavior: Behavior normal.         Thought Content: Thought content normal.         Judgment: Judgment normal.       Assessment/Plan   Problem List Items Addressed This Visit       Vitamin D deficiency    Relevant Orders    Vitamin D  25-Hydroxy,Total (for eval of Vitamin D levels)     Other Visit Diagnoses       Encounter for annual physical exam    -  Primary    Relevant Orders    CBC and Auto Differential    Comprehensive Metabolic Panel    Hemoglobin A1C    Lipid Panel    TSH with reflex to Free T4 if abnormal          Patient does not have any other specific symptoms.  Feeling tired and fatigue is a non specific symptoms and can be due to multiple reason like work and working with the kids and also can be because of not sleeping well at the night time. If you want to pursue than I can order sleep medicine study test but I do not have any specific answer.

## 2025-04-02 LAB
25(OH)D3+25(OH)D2 SERPL-MCNC: 24 NG/ML (ref 30–100)
ALBUMIN SERPL-MCNC: 4.8 G/DL (ref 3.6–5.1)
ALP SERPL-CCNC: 61 U/L (ref 36–130)
ALT SERPL-CCNC: 18 U/L (ref 9–46)
ANION GAP SERPL CALCULATED.4IONS-SCNC: 6 MMOL/L (CALC) (ref 7–17)
AST SERPL-CCNC: 20 U/L (ref 10–40)
BASOPHILS # BLD AUTO: 31 CELLS/UL (ref 0–200)
BASOPHILS NFR BLD AUTO: 0.7 %
BILIRUB SERPL-MCNC: 0.7 MG/DL (ref 0.2–1.2)
BUN SERPL-MCNC: 15 MG/DL (ref 7–25)
CALCIUM SERPL-MCNC: 9.5 MG/DL (ref 8.6–10.3)
CHLORIDE SERPL-SCNC: 104 MMOL/L (ref 98–110)
CHOLEST SERPL-MCNC: 144 MG/DL
CHOLEST/HDLC SERPL: 3.1 (CALC)
CO2 SERPL-SCNC: 31 MMOL/L (ref 20–32)
CREAT SERPL-MCNC: 1.04 MG/DL (ref 0.6–1.26)
EGFRCR SERPLBLD CKD-EPI 2021: 98 ML/MIN/1.73M2
EOSINOPHIL # BLD AUTO: 277 CELLS/UL (ref 15–500)
EOSINOPHIL NFR BLD AUTO: 6.3 %
ERYTHROCYTE [DISTWIDTH] IN BLOOD BY AUTOMATED COUNT: 12.9 % (ref 11–15)
EST. AVERAGE GLUCOSE BLD GHB EST-MCNC: 108 MG/DL
EST. AVERAGE GLUCOSE BLD GHB EST-SCNC: 6 MMOL/L
GLUCOSE SERPL-MCNC: 88 MG/DL (ref 65–139)
HBA1C MFR BLD: 5.4 % OF TOTAL HGB
HCT VFR BLD AUTO: 43.5 % (ref 38.5–50)
HDLC SERPL-MCNC: 46 MG/DL
HGB BLD-MCNC: 14.5 G/DL (ref 13.2–17.1)
LDLC SERPL CALC-MCNC: 76 MG/DL (CALC)
LYMPHOCYTES # BLD AUTO: 1628 CELLS/UL (ref 850–3900)
LYMPHOCYTES NFR BLD AUTO: 37 %
MCH RBC QN AUTO: 29.9 PG (ref 27–33)
MCHC RBC AUTO-ENTMCNC: 33.3 G/DL (ref 32–36)
MCV RBC AUTO: 89.7 FL (ref 80–100)
MONOCYTES # BLD AUTO: 400 CELLS/UL (ref 200–950)
MONOCYTES NFR BLD AUTO: 9.1 %
NEUTROPHILS # BLD AUTO: 2064 CELLS/UL (ref 1500–7800)
NEUTROPHILS NFR BLD AUTO: 46.9 %
NONHDLC SERPL-MCNC: 98 MG/DL (CALC)
PLATELET # BLD AUTO: 202 THOUSAND/UL (ref 140–400)
PMV BLD REES-ECKER: 11.4 FL (ref 7.5–12.5)
POTASSIUM SERPL-SCNC: 4.2 MMOL/L (ref 3.5–5.3)
PROT SERPL-MCNC: 7.1 G/DL (ref 6.1–8.1)
RBC # BLD AUTO: 4.85 MILLION/UL (ref 4.2–5.8)
SODIUM SERPL-SCNC: 141 MMOL/L (ref 135–146)
TRIGL SERPL-MCNC: 138 MG/DL
TSH SERPL-ACNC: 1.02 MIU/L (ref 0.4–4.5)
WBC # BLD AUTO: 4.4 THOUSAND/UL (ref 3.8–10.8)

## 2025-04-18 ENCOUNTER — OFFICE VISIT (OUTPATIENT)
Dept: URGENT CARE | Age: 30
End: 2025-04-18
Payer: OTHER GOVERNMENT

## 2025-04-18 VITALS
TEMPERATURE: 99.9 F | OXYGEN SATURATION: 98 % | DIASTOLIC BLOOD PRESSURE: 75 MMHG | HEART RATE: 80 BPM | RESPIRATION RATE: 20 BRPM | SYSTOLIC BLOOD PRESSURE: 121 MMHG

## 2025-04-18 DIAGNOSIS — B34.8 RHINOVIRUS: Primary | ICD-10-CM

## 2025-04-18 DIAGNOSIS — H66.001 ACUTE SUPPURATIVE OTITIS MEDIA OF RIGHT EAR WITHOUT SPONTANEOUS RUPTURE OF TYMPANIC MEMBRANE, RECURRENCE NOT SPECIFIED: ICD-10-CM

## 2025-04-18 DIAGNOSIS — J02.9 SORE THROAT: ICD-10-CM

## 2025-04-18 LAB
POC HUMAN RHINOVIRUS PCR: POSITIVE
POC INFLUENZA A VIRUS PCR: NEGATIVE
POC INFLUENZA B VIRUS PCR: NEGATIVE
POC RESPIRATORY SYNCYTIAL VIRUS PCR: NEGATIVE
POC STREPTOCOCCUS PYOGENES (GROUP A STREP) PCR: NEGATIVE

## 2025-04-18 RX ORDER — GUAIFENESIN AND PSEUDOEPHEDRINE HCL 1200; 120 MG/1; MG/1
1 TABLET, EXTENDED RELEASE ORAL 2 TIMES DAILY
Qty: 20 TABLET | Refills: 0 | Status: SHIPPED | OUTPATIENT
Start: 2025-04-18

## 2025-04-18 RX ORDER — AMOXICILLIN 875 MG/1
875 TABLET, FILM COATED ORAL 2 TIMES DAILY
Qty: 20 TABLET | Refills: 0 | Status: SHIPPED | OUTPATIENT
Start: 2025-04-18 | End: 2025-04-28

## 2025-04-18 ASSESSMENT — PATIENT HEALTH QUESTIONNAIRE - PHQ9
SUM OF ALL RESPONSES TO PHQ9 QUESTIONS 1 AND 2: 0
2. FEELING DOWN, DEPRESSED OR HOPELESS: NOT AT ALL
1. LITTLE INTEREST OR PLEASURE IN DOING THINGS: NOT AT ALL

## 2025-04-18 NOTE — PROGRESS NOTES
Subjective   Patient ID: Darrius Medrano is a 29 y.o. male. They present today with a chief complaint of Earache (Body aches, chills, fever/4 days).    History of Present Illness  Patient is a pleasant 29-year-old white male, no significant past medical history, presented to clinic with chief complaint of upper respiratory congestion.  Patient reported approximately 4 to 5-day history of upper respiratory congestion rhinorrhea postnasal drainage and dry cough.  He also reported development of a right ear pain over the past couple of days as well previously seen DayQuil at home with mild short-term leaf of his symptoms.  Denies any sputum production.  No chest pain or shortness of breath.  No abdominal pain, nausea, vomiting.  No numbness tingling or focal weakness.        Earache         Past Medical History  Allergies as of 04/18/2025    (No Known Allergies)       Prescriptions Prior to Admission[1]       Medical History[2]    Surgical History[3]     reports that he has never smoked. He has never used smokeless tobacco.    Review of Systems  Review of Systems   HENT:  Positive for ear pain.                                   Objective    Vitals:    04/18/25 0924   BP: 121/75   Pulse: 80   Resp: 20   Temp: 37.7 °C (99.9 °F)   SpO2: 98%     No LMP for male patient.    Physical Exam  General: Vitals Noted. No distress. Normocephalic.     HEENT: Left TM is within normal limits with adequate reflex and visible landmarks.  Right TM does appear moderately erythematous with exudative air-fluid levels and slight bulging of the TM.  EOMI, normal conjunctiva, patent nares with erythematous edematous and appear nasal turbinates and clear rhinorrhea bilaterally.  Posterior oropharynx with signs of postnasal drainage without any erythema swelling or tonsillar exudate.  Uvula is in the midline and nonedematous.  No drooling.  No trismus.    Neck: Supple with no adenopathy.     Cardiac: Regular Rate and Rhythm. No murmur.      Pulmonary: Equal breath sounds bilaterally. No wheezes, rhonchi, or rales.    Abdomen: Soft, non-tender, with normal bowel sounds.     Musculoskeletal: Moves all extremities, no effusion, no edema.     Skin: No obvious rashes.  Procedures    Point of Care Test & Imaging Results from this visit  Results for orders placed or performed in visit on 04/18/25   POCT SPOTFIRE R/ST Panel Mini w/Strep A ([facility]) manually resulted    Collection Time: 04/18/25  9:47 AM   Result Value Ref Range    POC Group A Strep, PCR Negative Negative    POC Respiratory Syncytial Virus PCR Negative Negative    POC Influenza A Virus PCR Negative Negative    POC Influenza B Virus PCR Negative Negative    POC Human Rhinovirus PCR Positive (A) Negative      Imaging  No results found.    Cardiology, Vascular, and Other Imaging  No other imaging results found for the past 2 days      Diagnostic study results (if any) were reviewed by Jonah Carrington PA-C.    Assessment/Plan   Allergies, medications, history, and pertinent labs/EKGs/Imaging reviewed by Jonah Carrington PA-C.     Medical Decision Making  Patient was seen eval in the clinic for to complaint of upper respiratory congestion.  On exam patient is nontoxic well-appearing resting comfortably no acute distress.  Vital signs are stable, afebrile.  Chest is clear, heart is regular, belly is diffusely soft and nontender.  ENT examination as above concerning for viral infection.  I do feel he is developing a right acute otitis media as well.  Spot fire sore throat panel was performed and returned positive for rhinovirus.  Will treat acute otitis media with a 10-day course of amoxicillin.  Advised Mucinex D as needed for his congestion.  Advised plenty of clear fluids and rest.  Advised Tylenol ibuprofen as needed.  I reviewed my impression, plan, strict return was report to ED precautions with the patient.  He expresses understanding and agreement plan of care.    Orders and  Diagnoses  Diagnoses and all orders for this visit:  Sore throat  -     POCT SPOTFIRE R/ST Panel Mini w/Strep A ([facility]) manually resulted        Medical Admin Record      Follow Up Instructions  No follow-ups on file.    Patient disposition: Home    Electronically signed by Jonah Carrington PA-C  9:51 AM         [1] (Not in a hospital admission)  [2] No past medical history on file.  [3] No past surgical history on file.

## 2025-07-28 ENCOUNTER — OFFICE VISIT (OUTPATIENT)
Dept: URGENT CARE | Age: 30
End: 2025-07-28
Payer: OTHER GOVERNMENT

## 2025-07-28 VITALS
RESPIRATION RATE: 19 BRPM | OXYGEN SATURATION: 97 % | HEART RATE: 65 BPM | TEMPERATURE: 97.7 F | SYSTOLIC BLOOD PRESSURE: 106 MMHG | DIASTOLIC BLOOD PRESSURE: 74 MMHG

## 2025-07-28 DIAGNOSIS — H66.004 RECURRENT ACUTE SUPPURATIVE OTITIS MEDIA OF RIGHT EAR WITHOUT SPONTANEOUS RUPTURE OF TYMPANIC MEMBRANE: Primary | ICD-10-CM

## 2025-07-28 DIAGNOSIS — J02.9 SORE THROAT: ICD-10-CM

## 2025-07-28 LAB
POC HUMAN RHINOVIRUS PCR: NEGATIVE
POC INFLUENZA A VIRUS PCR: NEGATIVE
POC INFLUENZA B VIRUS PCR: NEGATIVE
POC RESPIRATORY SYNCYTIAL VIRUS PCR: NEGATIVE
POC STREPTOCOCCUS PYOGENES (GROUP A STREP) PCR: NEGATIVE

## 2025-07-28 RX ORDER — AMOXICILLIN AND CLAVULANATE POTASSIUM 875; 125 MG/1; MG/1
875 TABLET, FILM COATED ORAL 2 TIMES DAILY
Qty: 14 TABLET | Refills: 0 | Status: SHIPPED | OUTPATIENT
Start: 2025-07-28 | End: 2025-08-04

## 2025-07-28 RX ORDER — BENZONATATE 200 MG/1
200 CAPSULE ORAL 3 TIMES DAILY PRN
Qty: 30 CAPSULE | Refills: 0 | Status: SHIPPED | OUTPATIENT
Start: 2025-07-28 | End: 2025-08-04

## 2025-07-28 ASSESSMENT — ENCOUNTER SYMPTOMS
COUGH: 1
RHINORRHEA: 1

## 2025-07-28 NOTE — PROGRESS NOTES
Subjective   Patient ID: Darrius Medrano is a 30 y.o. male. They present today with a chief complaint of Illness (I have an extremely sore though and a cough. - Entered by patient).    History of Present Illness  HPI    Past Medical History  Allergies as of 07/28/2025    (No Known Allergies)       Prescriptions Prior to Admission[1]     Medical History[2]    Surgical History[3]     reports that he has never smoked. He has never used smokeless tobacco.    Review of Systems  Review of Systems   HENT:  Positive for congestion, postnasal drip and rhinorrhea.    Respiratory:  Positive for cough.    All other systems reviewed and are negative.                                 Objective    Vitals:    07/28/25 0815   BP: 106/74   Pulse: 65   Resp: 19   Temp: 36.5 °C (97.7 °F)   SpO2: 97%     No LMP for male patient.    Physical Exam  Vitals and nursing note reviewed.   Constitutional:       Appearance: Normal appearance.   HENT:      Head: Normocephalic.      Right Ear: Tympanic membrane is injected, erythematous and bulging.      Left Ear: Hearing and ear canal normal. Tympanic membrane has decreased mobility.      Ears:      Comments: mild     Nose: Congestion present.      Mouth/Throat:      Mouth: Mucous membranes are moist.      Pharynx: Oropharynx is clear. Uvula midline. Postnasal drip present.      Comments: Minimal PND    Eyes:      Extraocular Movements: Extraocular movements intact.      Pupils: Pupils are equal, round, and reactive to light.       Cardiovascular:      Rate and Rhythm: Normal rate and regular rhythm.      Pulses: Normal pulses.      Heart sounds: Normal heart sounds.   Pulmonary:      Effort: Pulmonary effort is normal.      Breath sounds: Normal breath sounds.     Musculoskeletal:         General: Normal range of motion.      Cervical back: Normal range of motion and neck supple.     Skin:     General: Skin is warm and dry.     Neurological:      General: No focal deficit present.      Mental  "Status: He is alert and oriented to person, place, and time.     Psychiatric:         Mood and Affect: Mood normal.         Behavior: Behavior normal.         Procedures    Point of Care Test & Imaging Results from this visit  Results for orders placed or performed in visit on 07/28/25   POCT SPOTFIRE R/ST Panel Mini w/Strep A (Prosodic) manually resulted   Result Value Ref Range    POC Group A Strep, PCR Negative Negative    POC Respiratory Syncytial Virus PCR Negative Negative    POC Influenza A Virus PCR Negative Negative    POC Influenza B Virus PCR Negative Negative    POC Human Rhinovirus PCR Negative Negative      Imaging  No results found.    Cardiology, Vascular, and Other Imaging  No other imaging results found for the past 2 days      Diagnostic study results (if any) were reviewed by PRESLEY Quiroz.    Assessment/Plan   Allergies, medications, history, and pertinent labs/EKGs/Imaging reviewed by PRESLEY Quiroz.     Medical Decision Making  29 y/o m presents with sore throat x 2 days, able to drink and eat, wanted to make sure it was not serious. Patient denies headache, lightheadedness, dizziness, extremity weakness, numbness or tingling, chest pain, orthopnea, shortness of breath, wheezing, cough, abdominal pain, vomiting, diarrhea, constipation, or urinary symptoms.     [facility]fire- NEG    Pt managing secretions, airway intact. There or no signs of PTA/TA, trismus, retropharyngeal abscess or epiglotitis. No signs of osteomyelitis, orbital cellulitis or other complications of sinusitis at this point in time. there is no signs of respiratory distress. SpO2 >94% on RA. There is no reported SOB, CP, ORTIZ, pleuritic pain, fever. Given the pt underlying risk factors, and mild right TM exam will err on the side of caution and cover for bacterial etiology, but advised \"watchful waiting\" x 2 days . Pt well-hydrated, nontoxic and there no signs of clinical deterioration. Patient well " hydrated, neurovascularly intact. Advised normal saline mist spray TID, flonase daily, antihistamine daily, vit d3/c/zinc/elderberry, probiotics for gut health, and if s/s persist  f/u PCP    At time of discharge, patient was clinically well-appearing and appropriate for outpatient management. The patient/parent/guardian was educated regarding diagnosis, supportive care, OTC and Rx medications. The patient/parent/guardian was given the opportunity to ask questions prior to discharge. They verbalized understanding of discussion of treatment plan, expected course of illness and/or injury, indications on when to return to , when to seek further evaluation in ED/call 911, and the need to follow up with PCP and/or specialist as referred. Patient/parent/guardian was provided with work/school documentation if requested. Patient stable upon discharge       Follow up Care: Pt instructed to follow-up with PCP or other appropriate clinician within 24 to 48 hours. Report to ED if there is any development in worsening pain, difficulty swallowing, change in phonation, fever, chills, neck pain, photophobia, headache, neck stiffness, chest pain, abdominal pain, vomiting, syncope, hemoptysis, leg swelling SOB, fever, facial swelling, eye pain, periorbital swelling/erythema, or any new signs or sx.   - Symptomatic treatment with prn analgesia  - Supportive care with fluids and rest  - The patient may also use OTC decongestants prn, OTC cough and cold meds as needed, warm salt water gargles, throat lozenges and/or OTC throat spray as needed, and nasal saline gtts and suction prn.  - Follow up in  1 week  if symptoms persist or sooner if worsening of symptoms  - Pain relievers (tylenol) for relief of headache, body aches, malaise, muscle and joint pain, ear ache  - Rest, increased fluids, frequent hand washing  - RTC if symptoms persist, worsen or proceed to ER for symptoms of increased SOB, chest pain, resp distress, high fever,  pain with breathing, etc.   - The patient voiced understanding and agreed with the plan.      The patient was educated regarding diagnosis, supportive care, OTC and Rx medications. The patient was given the opportunity to ask questions prior to discharge. They verbalized understanding of my discussion of the plans for treatment, expected course, indications to return to  or seek further evaluation in ED, and the need for timely follow up as directed.     Can try Sambucol Black Elderberry Syrup and Extra Vitamin C and Zinc Lozenges (lozenges only if over 3 years of age)      1. **Stay Home**: Individuals who are sick should stay home for at least 24 hours after their fever has subsided without the use of fever-reducing medications.  2. **Avoid Close Contact**: Sick individuals should avoid close contact with others to prevent spreading the virus.  3. **Hygiene Practices**: Frequent handwashing, using hand sanitizers, and covering coughs and sneezes are encouraged to reduce transmission.  4. **Vaccination**: Annual flu vaccines are recommended for most individuals to help prevent illness      Orders and Diagnoses  Diagnoses and all orders for this visit:  Recurrent acute suppurative otitis media of right ear without spontaneous rupture of tympanic membrane  -     amoxicillin-clavulanate (Augmentin) 875-125 mg tablet; Take 1 tablet (875 mg of amoxicillin) by mouth 2 times a day for 7 days.  -     benzonatate (Tessalon) 200 mg capsule; Take 1 capsule (200 mg) by mouth 3 times a day as needed for cough for up to 7 days. Do not crush or chew.  Sore throat  -     POCT SPOTFIRE R/ST Panel Mini w/Strep A ([facility]) manually resulted      Medical Admin Record      Patient disposition: Home    Electronically signed by PRESLEY Quiroz  8:55 AM           [1] (Not in a hospital admission)   [2] History reviewed. No pertinent past medical history.  [3] History reviewed. No pertinent surgical history.

## 2026-04-01 ENCOUNTER — APPOINTMENT (OUTPATIENT)
Dept: PRIMARY CARE | Facility: CLINIC | Age: 31
End: 2026-04-01
Payer: OTHER GOVERNMENT